# Patient Record
Sex: FEMALE | Race: WHITE | Employment: FULL TIME | URBAN - METROPOLITAN AREA
[De-identification: names, ages, dates, MRNs, and addresses within clinical notes are randomized per-mention and may not be internally consistent; named-entity substitution may affect disease eponyms.]

---

## 2021-02-13 ENCOUNTER — HOSPITAL ENCOUNTER (EMERGENCY)
Facility: CLINIC | Age: 26
Discharge: HOME | End: 2021-02-13
Attending: EMERGENCY MEDICINE | Admitting: EMERGENCY MEDICINE
Payer: COMMERCIAL

## 2021-02-13 VITALS
HEART RATE: 84 BPM | WEIGHT: 140 LBS | DIASTOLIC BLOOD PRESSURE: 82 MMHG | BODY MASS INDEX: 19.6 KG/M2 | OXYGEN SATURATION: 99 % | RESPIRATION RATE: 20 BRPM | HEIGHT: 71 IN | TEMPERATURE: 97.8 F | SYSTOLIC BLOOD PRESSURE: 105 MMHG

## 2021-02-13 DIAGNOSIS — R40.20 LOSS OF CONSCIOUSNESS (H): ICD-10-CM

## 2021-02-13 DIAGNOSIS — F10.920 ALCOHOLIC INTOXICATION WITHOUT COMPLICATION (H): ICD-10-CM

## 2021-02-13 PROCEDURE — 99283 EMERGENCY DEPT VISIT LOW MDM: CPT

## 2021-02-13 PROCEDURE — 93005 ELECTROCARDIOGRAM TRACING: CPT

## 2021-02-13 ASSESSMENT — ENCOUNTER SYMPTOMS
CHILLS: 1
BACK PAIN: 0
MYALGIAS: 0
ABDOMINAL PAIN: 0

## 2021-02-13 ASSESSMENT — MIFFLIN-ST. JEOR: SCORE: 1486.17

## 2021-02-13 NOTE — ED TRIAGE NOTES
"Pt states had LOC tonight. Pt unable to recall events immediately prior to LOC. Pt admits to drinking \"a good amount\" of alcohol tonight. ABCs intact GCS 15  "

## 2021-02-13 NOTE — ED PROVIDER NOTES
"  History   Chief Complaint  Loss of Consciousness    HPI  Kelsey Camacho is a 25 year old female who presents for evaluation of loss of consciousness secondary to alcohol intoxication.  The patient states that she has been drinking heavily tonight with her friends who eventually brought her in because she lost consciousness and they were concerned.  She has consumed over 10 drinks tonight, of mixed variety.  She has some chills but she denies any pain.  She denies any drug or tobacco usage. She notes that she has heart arrhythmias but is not medicated for this.  She also states she is on anxiety medications.    Review of Systems   Constitutional: Positive for chills.        Intoxicated   Cardiovascular: Negative for chest pain.   Gastrointestinal: Negative for abdominal pain.   Musculoskeletal: Negative for back pain and myalgias.   Neurological:        Loss of consciousness    All other systems reviewed and are negative.    Allergies  No Known Allergies    Medications  Citalopram    Past Medical History  Anxiety    Social History  Presents to the ED alone but was brought in by friends.  PCP: No Ref-Primary, Physician      Physical Exam     Patient Vitals for the past 24 hrs:   BP Temp Temp src Pulse Resp SpO2 Height Weight   02/13/21 0245 105/82 -- -- 84 -- 99 % -- --   02/13/21 0230 104/72 -- -- 85 -- 100 % -- --   02/13/21 0215 114/84 -- -- 84 -- 100 % -- --   02/13/21 0201 112/72 97.8  F (36.6  C) Oral 87 20 99 % 1.803 m (5' 11\") 63.5 kg (140 lb)     Physical Exam  Nursing note and vitals reviewed.  Constitutional: Cooperative. Slurring words.  HENT:   Mouth/Throat: Mucous membranes are dry.   Eyes: Pupils are equal, round, and reactive to light.  Horizontal nystagmus noted.   Cardiovascular: Normal rate, regular rhythm and normal heart sounds.  No murmur.  Pulmonary/Chest: Effort normal and breath sounds normal. No respiratory distress. No wheezes. No rales.   Abdominal: Soft. Normal appearance. There is no " tenderness. There is no rigidity and no guarding.   Neurological: Alert. GCS 14 (slurring words).  Strength normal  Skin: Skin is warm and dry.  Psychiatric: Normal mood and affect.     Emergency Department Course   ECG:  ECG taken at 0211, ECG read at 0217  Normal sinus rhythm  normal ECG  Rate 83 bpm. WI interval 158 ms. QRS duration 96 ms. QT/QTc 398/467 ms. P-R-T axes 80 86 58.    Reviewed:  I reviewed nursing notes and vitals    Assessments:  2010 I physically examined the patient as documented above.  0316 Patient rechecked and updated that her ride is here.    Disposition:  The patient was discharged to home.     Impression & Plan     Medical Decision Making:  Kelsey Camacho is a 25 year old female who presents with concerns of being over sedated after indulging in too much alcohol this evening.  On exam, she is awake, converse, and protecting an airway.  She denies any injuries.  She denies she has been injured or assaulted this evening.  Plan of care will be to observe in the Emergency department for approximately an hour or so at which point she should be stable enough for discharge once we can find a sober ride.      Diagnosis:    ICD-10-CM    1. Alcoholic intoxication  F10.920    2. Loss of consciousness  R40.20          Scribe Disclosure:  I, Kelsey Layne, am serving as a scribe at 1:37 AM on 2/13/2021 to document services personally performed by Davidson Bone MD based on my observations and the provider's statements to me.      [facility]      Davidson Bone MD  02/13/21 0329

## 2021-02-15 LAB — INTERPRETATION ECG - MUSE: NORMAL

## 2021-05-01 ENCOUNTER — HEALTH MAINTENANCE LETTER (OUTPATIENT)
Age: 26
End: 2021-05-01

## 2021-10-11 ENCOUNTER — HEALTH MAINTENANCE LETTER (OUTPATIENT)
Age: 26
End: 2021-10-11

## 2022-05-22 ENCOUNTER — HEALTH MAINTENANCE LETTER (OUTPATIENT)
Age: 27
End: 2022-05-22

## 2022-09-25 ENCOUNTER — HEALTH MAINTENANCE LETTER (OUTPATIENT)
Age: 27
End: 2022-09-25

## 2023-06-04 ENCOUNTER — HEALTH MAINTENANCE LETTER (OUTPATIENT)
Age: 28
End: 2023-06-04